# Patient Record
Sex: MALE | Race: WHITE | NOT HISPANIC OR LATINO | Employment: FULL TIME | URBAN - METROPOLITAN AREA
[De-identification: names, ages, dates, MRNs, and addresses within clinical notes are randomized per-mention and may not be internally consistent; named-entity substitution may affect disease eponyms.]

---

## 2024-01-24 ENCOUNTER — APPOINTMENT (OUTPATIENT)
Dept: CT IMAGING | Facility: HOSPITAL | Age: 49
End: 2024-01-24
Payer: MEDICARE

## 2024-01-24 ENCOUNTER — APPOINTMENT (OUTPATIENT)
Dept: GENERAL RADIOLOGY | Facility: HOSPITAL | Age: 49
End: 2024-01-24
Payer: MEDICARE

## 2024-01-24 ENCOUNTER — HOSPITAL ENCOUNTER (EMERGENCY)
Facility: HOSPITAL | Age: 49
Discharge: HOME | End: 2024-01-24
Attending: EMERGENCY MEDICINE | Admitting: EMERGENCY MEDICINE
Payer: MEDICARE

## 2024-01-24 VITALS
WEIGHT: 227.07 LBS | TEMPERATURE: 98.5 F | SYSTOLIC BLOOD PRESSURE: 115 MMHG | OXYGEN SATURATION: 99 % | RESPIRATION RATE: 13 BRPM | HEART RATE: 105 BPM | BODY MASS INDEX: 32.51 KG/M2 | DIASTOLIC BLOOD PRESSURE: 97 MMHG | HEIGHT: 70 IN

## 2024-01-24 DIAGNOSIS — R55 VASOVAGAL SYNCOPE: ICD-10-CM

## 2024-01-24 DIAGNOSIS — J40 BRONCHITIS: Primary | ICD-10-CM

## 2024-01-24 LAB
ALBUMIN SERPL-MCNC: 4.5 G/DL (ref 3.5–5.2)
ALBUMIN/GLOB SERPL: 1.8 G/DL
ALP SERPL-CCNC: 88 U/L (ref 39–117)
ALT SERPL W P-5'-P-CCNC: 12 U/L (ref 1–41)
AMPHET+METHAMPHET UR QL: NEGATIVE
ANION GAP SERPL CALCULATED.3IONS-SCNC: 12.9 MMOL/L (ref 5–15)
AST SERPL-CCNC: 19 U/L (ref 1–40)
BARBITURATES UR QL SCN: NEGATIVE
BASOPHILS # BLD AUTO: 0.04 10*3/MM3 (ref 0–0.2)
BASOPHILS NFR BLD AUTO: 0.5 % (ref 0–1.5)
BENZODIAZ UR QL SCN: NEGATIVE
BILIRUB SERPL-MCNC: 0.4 MG/DL (ref 0–1.2)
BUN SERPL-MCNC: 17 MG/DL (ref 6–20)
BUN/CREAT SERPL: 13.4 (ref 7–25)
CALCIUM SPEC-SCNC: 9.3 MG/DL (ref 8.6–10.5)
CANNABINOIDS SERPL QL: POSITIVE
CHLORIDE SERPL-SCNC: 96 MMOL/L (ref 98–107)
CO2 SERPL-SCNC: 27.1 MMOL/L (ref 22–29)
COCAINE UR QL: NEGATIVE
CREAT SERPL-MCNC: 1.27 MG/DL (ref 0.76–1.27)
DEPRECATED RDW RBC AUTO: 39.5 FL (ref 37–54)
EGFRCR SERPLBLD CKD-EPI 2021: 70.1 ML/MIN/1.73
EOSINOPHIL # BLD AUTO: 0.07 10*3/MM3 (ref 0–0.4)
EOSINOPHIL NFR BLD AUTO: 0.8 % (ref 0.3–6.2)
ERYTHROCYTE [DISTWIDTH] IN BLOOD BY AUTOMATED COUNT: 11.9 % (ref 12.3–15.4)
ETHANOL BLD-MCNC: <10 MG/DL (ref 0–10)
ETHANOL UR QL: <0.01 %
FENTANYL UR-MCNC: NEGATIVE NG/ML
FLUAV SUBTYP SPEC NAA+PROBE: NOT DETECTED
FLUBV RNA ISLT QL NAA+PROBE: NOT DETECTED
GLOBULIN UR ELPH-MCNC: 2.5 GM/DL
GLUCOSE BLDC GLUCOMTR-MCNC: 100 MG/DL (ref 70–99)
GLUCOSE SERPL-MCNC: 101 MG/DL (ref 65–99)
HCT VFR BLD AUTO: 46.8 % (ref 37.5–51)
HGB BLD-MCNC: 15.9 G/DL (ref 13–17.7)
HOLD SPECIMEN: NORMAL
HOLD SPECIMEN: NORMAL
IMM GRANULOCYTES # BLD AUTO: 0.03 10*3/MM3 (ref 0–0.05)
IMM GRANULOCYTES NFR BLD AUTO: 0.4 % (ref 0–0.5)
LYMPHOCYTES # BLD AUTO: 2.2 10*3/MM3 (ref 0.7–3.1)
LYMPHOCYTES NFR BLD AUTO: 26.7 % (ref 19.6–45.3)
MCH RBC QN AUTO: 30.7 PG (ref 26.6–33)
MCHC RBC AUTO-ENTMCNC: 34 G/DL (ref 31.5–35.7)
MCV RBC AUTO: 90.3 FL (ref 79–97)
METHADONE UR QL SCN: NEGATIVE
MONOCYTES # BLD AUTO: 0.53 10*3/MM3 (ref 0.1–0.9)
MONOCYTES NFR BLD AUTO: 6.4 % (ref 5–12)
NEUTROPHILS NFR BLD AUTO: 5.38 10*3/MM3 (ref 1.7–7)
NEUTROPHILS NFR BLD AUTO: 65.2 % (ref 42.7–76)
NRBC BLD AUTO-RTO: 0 /100 WBC (ref 0–0.2)
NT-PROBNP SERPL-MCNC: <36 PG/ML (ref 0–450)
OPIATES UR QL: NEGATIVE
OXYCODONE UR QL SCN: NEGATIVE
PLATELET # BLD AUTO: 312 10*3/MM3 (ref 140–450)
PMV BLD AUTO: 10.8 FL (ref 6–12)
POTASSIUM SERPL-SCNC: 3.9 MMOL/L (ref 3.5–5.2)
PROT SERPL-MCNC: 7 G/DL (ref 6–8.5)
QT INTERVAL: 327 MS
QT INTERVAL: 401 MS
QTC INTERVAL: 436 MS
QTC INTERVAL: 440 MS
RBC # BLD AUTO: 5.18 10*6/MM3 (ref 4.14–5.8)
RSV RNA NPH QL NAA+NON-PROBE: NOT DETECTED
SARS-COV-2 RNA RESP QL NAA+PROBE: NOT DETECTED
SODIUM SERPL-SCNC: 136 MMOL/L (ref 136–145)
TROPONIN T SERPL HS-MCNC: 8 NG/L
WBC NRBC COR # BLD AUTO: 8.25 10*3/MM3 (ref 3.4–10.8)
WHOLE BLOOD HOLD COAG: NORMAL
WHOLE BLOOD HOLD SPECIMEN: NORMAL

## 2024-01-24 PROCEDURE — 80307 DRUG TEST PRSMV CHEM ANLYZR: CPT

## 2024-01-24 PROCEDURE — 82077 ASSAY SPEC XCP UR&BREATH IA: CPT

## 2024-01-24 PROCEDURE — 70450 CT HEAD/BRAIN W/O DYE: CPT

## 2024-01-24 PROCEDURE — 99285 EMERGENCY DEPT VISIT HI MDM: CPT

## 2024-01-24 PROCEDURE — 87637 SARSCOV2&INF A&B&RSV AMP PRB: CPT | Performed by: EMERGENCY MEDICINE

## 2024-01-24 PROCEDURE — 84484 ASSAY OF TROPONIN QUANT: CPT | Performed by: EMERGENCY MEDICINE

## 2024-01-24 PROCEDURE — 71260 CT THORAX DX C+: CPT

## 2024-01-24 PROCEDURE — 71045 X-RAY EXAM CHEST 1 VIEW: CPT

## 2024-01-24 PROCEDURE — 80053 COMPREHEN METABOLIC PANEL: CPT | Performed by: EMERGENCY MEDICINE

## 2024-01-24 PROCEDURE — 83880 ASSAY OF NATRIURETIC PEPTIDE: CPT | Performed by: EMERGENCY MEDICINE

## 2024-01-24 PROCEDURE — 93005 ELECTROCARDIOGRAM TRACING: CPT

## 2024-01-24 PROCEDURE — 25810000003 SODIUM CHLORIDE 0.9 % SOLUTION: Performed by: EMERGENCY MEDICINE

## 2024-01-24 PROCEDURE — 73562 X-RAY EXAM OF KNEE 3: CPT

## 2024-01-24 PROCEDURE — 85025 COMPLETE CBC W/AUTO DIFF WBC: CPT | Performed by: EMERGENCY MEDICINE

## 2024-01-24 PROCEDURE — 36415 COLL VENOUS BLD VENIPUNCTURE: CPT | Performed by: EMERGENCY MEDICINE

## 2024-01-24 PROCEDURE — 25510000001 IOPAMIDOL PER 1 ML: Performed by: EMERGENCY MEDICINE

## 2024-01-24 PROCEDURE — 94640 AIRWAY INHALATION TREATMENT: CPT

## 2024-01-24 PROCEDURE — 82948 REAGENT STRIP/BLOOD GLUCOSE: CPT

## 2024-01-24 PROCEDURE — 93005 ELECTROCARDIOGRAM TRACING: CPT | Performed by: EMERGENCY MEDICINE

## 2024-01-24 RX ORDER — IPRATROPIUM BROMIDE AND ALBUTEROL SULFATE 2.5; .5 MG/3ML; MG/3ML
3 SOLUTION RESPIRATORY (INHALATION) ONCE
Status: COMPLETED | OUTPATIENT
Start: 2024-01-24 | End: 2024-01-24

## 2024-01-24 RX ORDER — CLONAZEPAM 0.5 MG/1
0.5 TABLET ORAL 2 TIMES DAILY PRN
COMMUNITY

## 2024-01-24 RX ORDER — METHYLPREDNISOLONE 4 MG/1
TABLET ORAL
Qty: 21 TABLET | Refills: 0 | Status: SHIPPED | OUTPATIENT
Start: 2024-01-24 | End: 2024-01-29

## 2024-01-24 RX ORDER — SODIUM CHLORIDE 0.9 % (FLUSH) 0.9 %
10 SYRINGE (ML) INJECTION AS NEEDED
Status: DISCONTINUED | OUTPATIENT
Start: 2024-01-24 | End: 2024-01-24 | Stop reason: HOSPADM

## 2024-01-24 RX ORDER — ALBUTEROL SULFATE 90 UG/1
2 AEROSOL, METERED RESPIRATORY (INHALATION) EVERY 4 HOURS PRN
Qty: 6.7 G | Refills: 0 | Status: SHIPPED | OUTPATIENT
Start: 2024-01-24

## 2024-01-24 RX ADMIN — IOPAMIDOL 100 ML: 755 INJECTION, SOLUTION INTRAVENOUS at 14:41

## 2024-01-24 RX ADMIN — SODIUM CHLORIDE 2000 ML: 9 INJECTION, SOLUTION INTRAVENOUS at 13:54

## 2024-01-24 RX ADMIN — IPRATROPIUM BROMIDE AND ALBUTEROL SULFATE 3 ML: .5; 3 SOLUTION RESPIRATORY (INHALATION) at 14:19

## 2024-01-24 NOTE — DISCHARGE INSTRUCTIONS
All your lab work was within normal  Your blood work for congestive heart failure was negative  Chest x-ray was negative, CT of your chest was negative  CT of your head was negative next  You tested positive for THC  Please drink lots of fluids and stay hydrated and take medication as needed for shortness of breath.  Take Medrol Dosepak as prescribed and follow-up with your PCP

## 2024-01-24 NOTE — ED NOTES
"Pt was found on floor of his room by pharmacy technician, this RN responded promptly to room where patient was laying at foot of bed on the floor with his pants around his ankles, pt was diaphoretic and pale but responsive talking to staff pt reports he had \"a seizure\". This RN got pt in bed and obtained orthostatic VS and pt was 97/70 pulse 88 while laying and when pt sat up he stated \"it's happening again\" sitting blood pressure dropped to 63/45 and hr dropped to 54. Pt is aox 4 right now and reports feeling weak all over. [facility] and new [facility] placed   "

## 2024-01-24 NOTE — ED NOTES
PT ambulated around the ED for two minutes with no dizziness or syncope oxygen sat above 98% duration of the walk HR up to 105bpm

## 2024-01-24 NOTE — ED PROVIDER NOTES
Time: 1:15 PM EST  Date of encounter:  1/24/2024  Independent Historian/Clinical History and Information was obtained by:   Patient    History is limited by: N/A    Chief Complaint   Patient presents with    Shortness of Breath    Rash    Cough         History of Present Illness:  Patient is a 48 y.o. year old male who presents to the emergency department for evaluation of shortness of breath and cough for the past couple weeks.  Patient states he is having a productive cough of thick clear sputum.  Patient states that he can walk to the bathroom and he feels short of breath.  Denies recent travel or history of a blood clot.  Patient states he also has been having some bumps on his chest and upper arms.  No changes wanted detergent, body wash and states that these little bumps keep coming back.  He admits to nausea and diarrhea at times.  Denies recent travel or exposure to illness.  Admits to subjective fever.     Patient Care Team  Primary Care Provider: Dagoberto Chatman MD    Past Medical History:     No Known Allergies  Past Medical History:   Diagnosis Date    ADHD     Hypertension      History reviewed. No pertinent surgical history.  History reviewed. No pertinent family history.    Home Medications:  Prior to Admission medications    Medication Sig Start Date End Date Taking? Authorizing Provider   amLODIPine-benazepril (LOTREL) 10-20 MG per capsule Take 1 capsule by mouth Daily.    Emergency, Nurse Zoran RN   amphetamine-dextroamphetamine XR (ADDERALL XR) 10 MG 24 hr capsule Take 10 mg by mouth Every Morning    Emergency, Nurse Zoran RN   pregabalin (LYRICA) 100 MG capsule Take 100 mg by mouth 2 (Two) Times a Day.    Emergency, Nurse Zoran RN   methylPREDNISolone (MEDROL) 4 MG dose pack Take as directed on package instructions. 5/5/22 1/24/24  Stevie Terrell PA        Social History:   Social History     Tobacco Use    Smoking status: Never    Smokeless tobacco: Never   Substance Use Topics    Alcohol  "use: Yes         Review of Systems:  Review of Systems   Constitutional:  Positive for fever (Subjective).   HENT: Negative.     Eyes: Negative.    Respiratory:  Positive for cough and shortness of breath.    Cardiovascular: Negative.    Gastrointestinal: Negative.    Endocrine: Negative.    Genitourinary: Negative.    Musculoskeletal: Negative.    Skin:  Positive for rash.   Allergic/Immunologic: Negative.    Neurological: Negative.    Hematological: Negative.    Psychiatric/Behavioral: Negative.          Physical Exam:  /97 (Patient Position: Standing)   Pulse 105   Temp 98.5 °F (36.9 °C)   Resp 13   Ht 177.8 cm (70\")   Wt 103 kg (227 lb 1.2 oz)   SpO2 99%   BMI 32.58 kg/m²         Physical Exam  Vitals and nursing note reviewed.   Constitutional:       Appearance: Normal appearance. He is normal weight.   HENT:      Head: Normocephalic and atraumatic.      Right Ear: Tympanic membrane normal.      Left Ear: Tympanic membrane normal.      Nose: Nose normal.      Mouth/Throat:      Mouth: Mucous membranes are moist.   Eyes:      Extraocular Movements: Extraocular movements intact.      Conjunctiva/sclera: Conjunctivae normal.      Pupils: Pupils are equal, round, and reactive to light.   Cardiovascular:      Rate and Rhythm: Normal rate and regular rhythm.      Heart sounds: Normal heart sounds.   Pulmonary:      Effort: Pulmonary effort is normal. No respiratory distress.      Breath sounds: Normal breath sounds. No stridor. No wheezing, rhonchi or rales.   Musculoskeletal:         General: Normal range of motion.      Cervical back: Normal range of motion and neck supple.   Skin:     General: Skin is warm and dry.      Findings: Rash (4 areas of erythematous bumps noted on the chest and bilateral upper arm) present.   Neurological:      General: No focal deficit present.      Mental Status: He is alert and oriented to person, place, and time.   Psychiatric:         Mood and Affect: Mood normal.       "   Behavior: Behavior normal.                 Procedures:  Procedures      Medical Decision Making:      Comorbidities that affect care:    Hypertension    External Notes reviewed:    Previous Clinic Note: Urgent care from 5/5/2022      The following orders were placed and all results were independently analyzed by me:  Orders Placed This Encounter   Procedures    COVID-19, FLU A/B, RSV PCR 1 HR TAT - Swab, Nasopharynx    XR Chest 1 View    CT Chest With Contrast Diagnostic    CT Head Without Contrast    XR Knee 3 View Left    [place] Draw    Comprehensive Metabolic Panel    BNP    Single High Sensitivity Troponin T    CBC Auto Differential    Urine Drug Screen - Urine, Clean Catch    Ethanol    NPO Diet NPO Type: Strict NPO    Undress & Gown    Continuous Pulse Oximetry    Vital Signs    Orthostatic Vitals    Check Pulse Oximetry while ambulating    Orthostatic Vitals    Oxygen Therapy- Nasal Cannula; Titrate 1-6 LPM Per SpO2; 90 - 95%    POC Glucose STAT    POC Glucose Once    ECG 12 Lead ED Triage Standing Order; SOA    ECG 12 Lead Syncope    Insert Peripheral IV    CBC & Differential    Green Top (Gel)    Lavender Top    Gold Top - SST    Light Blue Top       Medications Given in the Emergency Department:  Medications   sodium chloride 0.9 % flush 10 mL (has no administration in time range)   ipratropium-albuterol (DUO-NEB) nebulizer solution 3 mL (3 mL Nebulization Given 1/24/24 1419)   sodium chloride 0.9 % bolus 2,000 mL (0 mL Intravenous Stopped 1/24/24 1424)   iopamidol (ISOVUE-370) 76 % injection 100 mL (100 mL Intravenous Given 1/24/24 1441)        ED Course:    The patient was initially evaluated in the triage area where orders were placed. The patient was later dispositioned by Hernán Bradshaw PA-C.      The patient was advised to stay for completion of workup which includes but is not limited to communication of labs and radiological results, reassessment and plan. The patient was advised that  leaving prior to disposition by a provider could result in critical findings that are not communicated to the patient.     ED Course as of 01/24/24 1658   Wed Jan 24, 2024   1332 Mayra IRIZARRY RN states she was walking by the patient's room and saw that he was on the floor face up.  She checked pulse and patient woke up.  He had his pants down and urinated on himself. [AJ]   1602 Device (Oxygen Therapy): room air [AJ]      ED Course User Index  [AJ] Hernán Bradshaw PA-C       Labs:    Lab Results (last 24 hours)       Procedure Component Value Units Date/Time    COVID-19, FLU A/B, RSV PCR 1 HR TAT - Swab, Nasopharynx [[id number]]  (Normal) Collected: 01/24/24 0952    Specimen: Swab from Nasopharynx Updated: 01/24/24 1042     COVID19 Not Detected     Influenza A PCR Not Detected     Influenza B PCR Not Detected     RSV, PCR Not Detected    Narrative:      Fact sheet for providers: https://www.fda.gov/media/027780/download    Fact sheet for patients: https://www.fda.gov/media/823482/download    Test performed by PCR.    CBC & Differential [[id number]]  (Abnormal) Collected: 01/24/24 0958    Specimen: Blood Updated: 01/24/24 1010    Narrative:      The following orders were created for panel order CBC & Differential.  Procedure                               Abnormality         Status                     ---------                               -----------         ------                     CBC Auto Differential[[id number]]        Abnormal            Final result                 Please view results for these tests on the individual orders.    Comprehensive Metabolic Panel [[id number]]  (Abnormal) Collected: 01/24/24 0958    Specimen: Blood Updated: 01/24/24 1034     Glucose 101 mg/dL      BUN 17 mg/dL      Creatinine 1.27 mg/dL      Sodium 136 mmol/L      Potassium 3.9 mmol/L      Comment: Slight hemolysis detected by analyzer. Result may be falsely elevated.        Chloride 96 mmol/L      CO2 27.1 mmol/L      Calcium 9.3  mg/dL      Total Protein 7.0 g/dL      Albumin 4.5 g/dL      ALT (SGPT) 12 U/L      AST (SGOT) 19 U/L      Alkaline Phosphatase 88 U/L      Total Bilirubin 0.4 mg/dL      Globulin 2.5 gm/dL      A/G Ratio 1.8 g/dL      BUN/Creatinine Ratio 13.4     Anion Gap 12.9 mmol/L      eGFR 70.1 mL/min/1.73     Narrative:      GFR Normal >60  Chronic Kidney Disease <60  Kidney Failure <15      BNP [[id number]]  (Normal) Collected: 01/24/24 0958    Specimen: Blood Updated: 01/24/24 1031     proBNP <36.0 pg/mL     Narrative:      This assay is used as an aid in the diagnosis of individuals suspected of having heart failure. It can be used as an aid in the diagnosis of acute decompensated heart failure (ADHF) in patients presenting with signs and symptoms of ADHF to the emergency department (ED). In addition, NT-proBNP of <300 pg/mL indicates ADHF is not likely.    Age Range Result Interpretation  NT-proBNP Concentration (pg/mL:      <50             Positive            >450                   Gray                 300-450                    Negative             <300    50-75           Positive            >900                  Gray                300-900                  Negative            <300      >75             Positive            >1800                  Gray                300-1800                  Negative            <300    Single High Sensitivity Troponin T [[id number]]  (Normal) Collected: 01/24/24 0958    Specimen: Blood Updated: 01/24/24 1034     HS Troponin T 8 ng/L     Narrative:      High Sensitive Troponin T Reference Range:  <14.0 ng/L- Negative Female for AMI  <22.0 ng/L- Negative Male for AMI  >=14 - Abnormal Female indicating possible myocardial injury.  >=22 - Abnormal Male indicating possible myocardial injury.   Clinicians would have to utilize clinical acumen, EKG, Troponin, and serial changes to determine if it is an Acute Myocardial Infarction or myocardial injury due to an underlying chronic condition.          CBC Auto Differential [[id number]]  (Abnormal) Collected: 01/24/24 0958    Specimen: Blood Updated: 01/24/24 1010     WBC 8.25 10*3/mm3      RBC 5.18 10*6/mm3      Hemoglobin 15.9 g/dL      Hematocrit 46.8 %      MCV 90.3 fL      MCH 30.7 pg      MCHC 34.0 g/dL      RDW 11.9 %      RDW-SD 39.5 fl      MPV 10.8 fL      Platelets 312 10*3/mm3      Neutrophil % 65.2 %      Lymphocyte % 26.7 %      Monocyte % 6.4 %      Eosinophil % 0.8 %      Basophil % 0.5 %      Immature Grans % 0.4 %      Neutrophils, Absolute 5.38 10*3/mm3      Lymphocytes, Absolute 2.20 10*3/mm3      Monocytes, Absolute 0.53 10*3/mm3      Eosinophils, Absolute 0.07 10*3/mm3      Basophils, Absolute 0.04 10*3/mm3      Immature Grans, Absolute 0.03 10*3/mm3      nRBC 0.0 /100 WBC     Ethanol [[id number]] Collected: 01/24/24 0958    Specimen: Blood Updated: 01/24/24 1342     Ethanol <10 mg/dL      Ethanol % <0.010 %     Narrative:      Ethanol (Plasma)  <10 Essentially Negative    Toxic Concentrations           mg/dL    Flushing, slowing of reflexes    Impaired visual activity         Depression of CNS              >100  Possible Coma                  >300       POC Glucose Once [[id number]]  (Abnormal) Collected: 01/24/24 1333    Specimen: Blood Updated: 01/24/24 1334     Glucose 100 mg/dL      Comment: Serial Number: [id number]:  [id number]       Urine Drug Screen - Urine, Clean Catch [[id number]]  (Abnormal) Collected: 01/24/24 1603    Specimen: Urine, Clean Catch Updated: 01/24/24 1649     Amphet/Methamphet, Screen Negative     Barbiturates Screen, Urine Negative     Benzodiazepine Screen, Urine Negative     Cocaine Screen, Urine Negative     Opiate Screen Negative     THC, Screen, Urine Positive     Methadone Screen, Urine Negative     Oxycodone Screen, Urine Negative     Fentanyl, Urine Negative    Narrative:      Negative Thresholds Per Drugs Screened:    Amphetamines                 500 ng/ml  Barbiturates                 200  ng/ml  Benzodiazepines              100 ng/ml  Cocaine                      300 ng/ml  Methadone                    300 ng/ml  Opiates                      300 ng/ml  Oxycodone                    100 ng/ml  THC                           50 ng/ml  Fentanyl                       5 ng/ml      The Normal Value for all drugs tested is negative. This report includes final unconfirmed screening results to be used for medical treatment purposes only. Unconfirmed results must not be used for non-medical purposes such as employment or legal testing. Clinical consideration should be applied to any drug of abuse test, particularly when unconfirmed results are used.                     Imaging:    CT Head Without Contrast    Result Date: 1/24/2024  PROCEDURE: CT HEAD WO CONTRAST  COMPARISON:  [facility], CT, CT CHEST W CONTRAST DIAGNOSTIC, 1/24/2024, 14:42. INDICATIONS: SYNCOPE, SEIZURE  PROTOCOL:   Standard imaging protocol performed    RADIATION:   DLP: 1082.2 mGy*cm   MA and/or KV was adjusted to minimize radiation dose.     TECHNIQUE: After obtaining the patient's consent, CT images were obtained without non-ionic intravenous contrast material.  FINDINGS:  Brain:  No acute intracranial hemorrhage or mass effect is noted.  The ventricles, cisterns and sulci appear within normal limits.  Gray-white differentiation is maintained.  No suspicious extra-axial fluid collection noted.  Small amount air within the cavernous sinuses likely from recent venous access  Orbits:  Orbits are grossly unremarkable and periorbital soft tissues appear grossly unremarkable.  Sinuses:  Paranasal sinuses are clear.  Mastoid air cells are clear.  Calvarium and soft tissues:  Bony calvarium is intact.  Soft tissues are grossly unremarkable in appearance.         1. No acute intracranial hemorrhage or mass effect noted     TORITO DOYLE MD       Electronically Signed and Approved By: TORITO DOYLE MD on 1/24/2024 at 15:01              CT Chest With Contrast Diagnostic    Result Date: 1/24/2024  PROCEDURE: CT CHEST W CONTRAST DIAGNOSTIC  COMPARISON:  [facility], CR, XR CHEST 1 VW, 1/24/2024, 10:36.  INDICATIONS: Shortness of breath.  Cough.  PROTOCOL:   Pulmonary embolism imaging protocol performed    RADIATION:   DLP: 517.1 mGy*cm   Automated exposure control was utilized to minimize radiation dose. CONTRAST: 100 cc Isovue 370 I.V.  TECHNIQUE: Axial images of the chest with intravenous contrast.  FINDINGS: There is motion artifact.  No pulmonary emboli are identified.  No evidence of pleural or pericardial effusion.  No evidence of pneumothorax.  No coronary artery calcification is identified.  The thoracic aorta has a normal caliber.  There is no mediastinal, axillary or hilar adenopathy.  There are areas of bronchial wall thickening with atelectasis in the mid and lower lung fields.  No dense areas of consolidation are identified.  There are no cavitary lesions.  Degenerative changes are present in the thoracic spine.  Images of the upper abdomen are unremarkable.  IMPRESSION:  1. No pulmonary emboli are identified.  2. Areas of bronchial wall thickening with atelectasis in the mid and lower lung fields possibly secondary to bronchitis.   PEYTON LARSON MD       Electronically Signed and Approved By: PEYTON LARSON MD on 1/24/2024 at 14:57             XR Knee 3 View Left    Result Date: 1/24/2024  PROCEDURE: XR KNEE 3 VW LEFT  COMPARISON: None  INDICATIONS: GENERALIZED LEFT KNEE PAIN AFTER FALL TODAY  FINDINGS: There is no evidence for displaced fracture or dislocation. No definitive joint effusion is observed. No focal osseous abnormalities are seen. The soft tissues are unremarkable.       No evidence for displaced fracture or dislocation.      JONNATHAN CONTE MD       Electronically Signed and Approved By: JONNATHAN CONTE MD on 1/24/2024 at 14:34             XR Chest 1 View    Result Date: 1/24/2024  PROCEDURE: XR CHEST 1  VW  COMPARISON: [facility], CR, ABDOMEN SERIES ACUTE, 10/28/2014, 17:46.  INDICATIONS: SOA Triage Protocol/SHORTNESS OF BREATH AND DIFFICULTY BREATHING, COUGH FOR ABOUT A WEEK  FINDINGS:  The heart is not definitely enlarged.  The lungs seem clear.  There are no pleural effusions.  The visualized osseous structures do not appear unusual.        1. An acute pulmonary process is not apparent.       DELL RIVAS MD       Electronically Signed and Approved By: DELL RIVAS MD on 1/24/2024 at 10:50                Differential Diagnosis and Discussion:      Cough: Differential diagnosis includes but is not limited to pneumonia, acute bronchitis, upper respiratory infection, ACE inhibitor use, allergic reaction, epiglottitis, seasonal allergies, chemical irritants, exercise-induced asthma, viral syndrome.  Dyspnea: Differential diagnosis includes but is not limited to metabolic acidosis, neurological disorders, psychogenic, asthma, pneumothorax, upper airway obstruction, COPD, pneumonia, noncardiogenic pulmonary edema, interstitial lung disease, anemia, congestive heart failure, and pulmonary embolism  Rash: Differential diagnosis includes but is not limited to sepsis, cellulitis, [place] Spotted Fever, meningitis, meningococcemia, Varicella, Strep infection, dermatitis, allergic reaction, Lyme disease, and toxic shock syndrome.    All labs were reviewed and interpreted by me.  All X-rays impressions were independently interpreted by me.  EKG was interpreted by me.  CT scan radiology impression was interpreted by me.    MDM     Amount and/or Complexity of Data Reviewed  Clinical lab tests: reviewed  Tests in the radiology section of CPT®: reviewed  Tests in the medicine section of CPT®: reviewed  Decide to obtain previous medical records or to obtain history from someone other than the patient: yes                 Patient Care Considerations:    CONSULT: I considered consulting hospitalist for  admission, however patient's blood pressure improved after 2 L of fluid      Consultants/Shared Management Plan:    None    Social Determinants of Health:    Patient is independent, reliable, and has access to care.       Disposition and Care Coordination:    Discharged: The patient is suitable and stable for discharge with no need for consideration of admission.    I have explained the patient´s condition, diagnoses and treatment plan based on the information available to me at this time. I have answered questions and addressed any concerns. The patient has a good  understanding of the patient´s diagnosis, condition, and treatment plan as can be expected at this point. The vital signs have been stable. The patient´s condition is stable and appropriate for discharge from the emergency department.      The patient will pursue further outpatient evaluation with the primary care physician or other designated or consulting physician as outlined in the discharge instructions. They are agreeable to this plan of care and follow-up instructions have been explained in detail. The patient has received these instructions in written format and have expressed an understanding of the discharge instructions. The patient is aware that any significant change in condition or worsening of symptoms should prompt an immediate return to this or the closest emergency department or call to 911.  I have explained discharge medications and the need for follow up with the patient/caretakers. This was also printed in the discharge instructions. Patient was discharged with the following medications and follow up:      Medication List        New Prescriptions      albuterol sulfate  (90 Base) MCG/ACT inhaler  Commonly known as: PROVENTIL HFA;VENTOLIN HFA;PROAIR HFA  Inhale 2 puffs Every 4 (Four) Hours As Needed for Wheezing.     methylPREDNISolone 4 MG dose pack  Commonly known as: MEDROL  Take 6 tablets by mouth Daily for 1 day, THEN 5  tablets Daily for 1 day, THEN 4 tablets Daily for 1 day, THEN 3 tablets Daily for 1 day, THEN 2 tablets Daily for 1 day, THEN 1 tablet Daily for 1 day. Take as directed on package instructions.  Start taking on: January 24, 2024               Where to Get Your Medications        These medications were sent to [id number] - [place], [place] - [address] AT [facility] OF [address] [facility] & [place] - [phone]  - [phone]   [address] [place] [address]      Phone: [phone]   albuterol sulfate  (90 Base) MCG/ACT inhaler  methylPREDNISolone 4 MG dose pack      Dagoberto Chatman MD  [address] [address] [address] [phone]             Final diagnoses:   Bronchitis   Vasovagal syncope        ED Disposition       ED Disposition   Discharge    Condition   Stable    Comment   --               This medical record created using voice recognition software.             Hernán Bradshaw PA-C  01/24/24 1907

## 2024-01-31 LAB
QT INTERVAL: 327 MS
QT INTERVAL: 401 MS
QTC INTERVAL: 436 MS
QTC INTERVAL: 440 MS